# Patient Record
Sex: MALE | Race: OTHER | NOT HISPANIC OR LATINO | URBAN - METROPOLITAN AREA
[De-identification: names, ages, dates, MRNs, and addresses within clinical notes are randomized per-mention and may not be internally consistent; named-entity substitution may affect disease eponyms.]

---

## 2024-02-05 ENCOUNTER — EMERGENCY (EMERGENCY)
Facility: HOSPITAL | Age: 64
LOS: 1 days | Discharge: HOME | End: 2024-02-05
Attending: EMERGENCY MEDICINE
Payer: MEDICARE

## 2024-02-05 VITALS
RESPIRATION RATE: 20 BRPM | HEIGHT: 67 IN | OXYGEN SATURATION: 98 % | HEART RATE: 78 BPM | WEIGHT: 190.04 LBS | SYSTOLIC BLOOD PRESSURE: 143 MMHG | DIASTOLIC BLOOD PRESSURE: 89 MMHG | TEMPERATURE: 98 F

## 2024-02-05 PROCEDURE — 99283 EMERGENCY DEPT VISIT LOW MDM: CPT | Mod: FS

## 2024-02-05 PROCEDURE — 99282 EMERGENCY DEPT VISIT SF MDM: CPT

## 2024-02-05 RX ORDER — DIPHENHYDRAMINE HCL 50 MG
25 CAPSULE ORAL ONCE
Refills: 0 | Status: COMPLETED | OUTPATIENT
Start: 2024-02-05 | End: 2024-02-05

## 2024-02-05 RX ADMIN — Medication 25 MILLIGRAM(S): at 14:10

## 2024-02-05 NOTE — ED PROVIDER NOTE - OBJECTIVE STATEMENT
63-year-old male presents to ED complaining of rash to his chest armpit and genital region x 3 days.  Patient explained that he was given some shower gel by his resident coordinator and after taking a shower he applied a shower gel to his arm.  Forearm and genital region.  Patient said he noticed hardening of the skin and rash.  Patient stated he presented to ED for evaluation and appropriate treatment.  Patient denies any other issue at this time

## 2024-02-05 NOTE — ED PROVIDER NOTE - PATIENT PORTAL LINK FT
You can access the FollowMyHealth Patient Portal offered by [facility] by registering at the following website: [email or web address]. By joining [employer]’s FollowMyHealth portal, you will also be able to view your health information using other applications (apps) compatible with our system.

## 2024-02-05 NOTE — ED PROVIDER NOTE - CLINICAL SUMMARY MEDICAL DECISION MAKING FREE TEXT BOX
63-year-old male presents to ED complaining of rash to his chest armpit and genital region x 3 days.  Patient explained that he was given some shower gel by his resident coordinator and after taking a shower he applied a shower gel to his arm.  Forearm and genital region.  Patient said he noticed hardening of the skin and rash.  Patient stated he presented to ED for evaluation and appropriate treatment.  HEENT: Normal findings, Eyes : PERRLA, EOMI , Nares cler and Throat : WNL  Lungs: Clear B/L with good air entry  CVS: S1-S2 , with no murmurs  Abd : Normal BS, with no tenderness or organomegaly  Ext: Normal findings  Skin:  dry skin noted  sore chest, axillary and genital region.  Patient goes to discontinue the use of shower gel.  Patient given cream for dry skin in ED with instruction to use it twice a day.

## 2024-02-05 NOTE — ED PROVIDER NOTE - ATTENDING APP SHARED VISIT CONTRIBUTION OF CARE
Elie GAMEZKO-82-tezd-old male presents with diffuse itchy scaly rash all over the trunk arms and legs after using a new body wash.  Patient has itching and dryness.  No difficulty breathing swallowing.    Patient is alert well-appearing male, S1-S2 normal regular, bilateral clear breath sounds, abdomen is soft nontender, neuro exam is alert oriented x 3 no focal deficits skin is warm dry with diffuse scaly itchy rash on the trunk to the legs    Patient likely has allergic dermatitis and will discharge him with moisturizing cream.  Patient advised not to use the same body wash.

## 2024-02-05 NOTE — ED PROVIDER NOTE - NSFOLLOWUPINSTRUCTIONS_ED_ALL_ED_FT
Patient has been instructed to use dry skin cream twice a day for his dry skin.  Patient was advised to take a shower every other day to prevent drying and the moisturization of the skin.  Patient can follow-up with the dermatologist as discussed

## 2024-02-08 DIAGNOSIS — R21 RASH AND OTHER NONSPECIFIC SKIN ERUPTION: ICD-10-CM

## 2024-02-08 DIAGNOSIS — L85.3 XEROSIS CUTIS: ICD-10-CM

## 2024-02-26 ENCOUNTER — NON-APPOINTMENT (OUTPATIENT)
Age: 64
End: 2024-02-26

## 2024-11-21 ENCOUNTER — EMERGENCY (EMERGENCY)
Facility: HOSPITAL | Age: 64
LOS: 1 days | Discharge: HOME | End: 2024-11-21
Attending: EMERGENCY MEDICINE
Payer: MEDICARE

## 2024-11-21 VITALS — SYSTOLIC BLOOD PRESSURE: 153 MMHG | DIASTOLIC BLOOD PRESSURE: 78 MMHG

## 2024-11-21 VITALS
RESPIRATION RATE: 20 BRPM | OXYGEN SATURATION: 96 % | TEMPERATURE: 98 F | HEART RATE: 98 BPM | SYSTOLIC BLOOD PRESSURE: 172 MMHG | WEIGHT: 195.99 LBS | DIASTOLIC BLOOD PRESSURE: 109 MMHG

## 2024-11-21 PROCEDURE — 99283 EMERGENCY DEPT VISIT LOW MDM: CPT | Mod: GC

## 2024-11-21 PROCEDURE — 99282 EMERGENCY DEPT VISIT SF MDM: CPT

## 2024-11-21 NOTE — ED PROVIDER NOTE - ATTENDING CONTRIBUTION TO CARE
64 year old male with pmhx of htn presents from [place] for hypertension. Pt states earlier today he had a headache and felt anxious, BP was checked and was 170s/100s causing staff at [facility] [place] to call EMS. Pt states symptoms resolved on their own and is currently asymptomatic in the ED. Repeat BP in /78. Pt states he has been compliant with metoprolol and amlodipine  Pt remains asymptomatic - blood pressure improved without intervention. Pt without any concerns or complaints at this time. Stable for discharge, continue taking home meds as prescribed, return precautions given.

## 2024-11-21 NOTE — ED PROVIDER NOTE - PATIENT PORTAL LINK FT
You can access the FollowMyHealth Patient Portal offered by [facility] by registering at the following website: [email or web address]. By joining [employer]’s FollowMyHealth portal, you will also be able to view your health information using other applications (apps) compatible with our system.

## 2024-11-21 NOTE — ED PROVIDER NOTE - CLINICAL SUMMARY MEDICAL DECISION MAKING FREE TEXT BOX
64 year old male with pmhx of htn presents from [place] for hypertension. Pt states earlier today he had a headache and felt anxious, BP was checked and was 170s/100s causing staff at [place] to call EMS. Pt states symptoms resolved on their own and is currently asymptomatic in the ED. Repeat BP in /78. Pt states he has been compliant with metoprolol and amlodipine  Pt remains asymptomatic - blood pressure improved without intervention. Pt without any concerns or complaints at this time. Stable for discharge, continue taking home meds as prescribed, return precautions given.

## 2024-11-21 NOTE — ED PROVIDER NOTE - PHYSICAL EXAMINATION
General: Awake, alert, lying in bed in NAD  HEENT: Normocephalic, atraumatic. No scleral icterus or conjunctival injection. EOMI. Moist mucous membranes. Oropharynx clear.   Neck:. Soft and supple.  Cardiac: RRR, Peripheral pulses 2+ and symmetric. No LE edema.  Resp: Lungs CTAB. No accessory muscle use  Abd: Soft, non-tender, non-distended. No guarding, rebound, or rigidity.  Back: Spine midline and non-tender.   Skin: No rashes, abrasions, or lacerations.  Neuro: AO x 4. Moves all extremities symmetrically. Motor strength and sensation grossly intact. Lower extremity reflexes intact.  Psych: Appropriate mood and affect

## 2024-11-21 NOTE — ED PROVIDER NOTE - OBJECTIVE STATEMENT
64 year old male with pmhx of htn presents from [place] for hypertension. Per patient 64 year old male with pmhx of htn presents from [place] for hypertension. Pt states earlier today he had a headache and felt anxious, BP was checked and was 170s/100s causing staff at [place] to call EMS. Pt states symptoms resolved on their own and is currently asymptomatic in the ED. Repeat BP in /78. Pt states he has been compliant with metoprolol and amlodipine. Pt denies chest pain, sob, abdominal pain, vision changes, focal neuro deficits, recent uri symptoms, fever, or any other complaints at this time.

## 2025-05-09 ENCOUNTER — EMERGENCY (EMERGENCY)
Facility: HOSPITAL | Age: 65
LOS: 1 days | End: 2025-05-09
Attending: STUDENT IN AN ORGANIZED HEALTH CARE EDUCATION/TRAINING PROGRAM
Payer: MEDICARE

## 2025-05-09 VITALS
OXYGEN SATURATION: 95 % | HEART RATE: 105 BPM | RESPIRATION RATE: 18 BRPM | SYSTOLIC BLOOD PRESSURE: 171 MMHG | DIASTOLIC BLOOD PRESSURE: 104 MMHG | TEMPERATURE: 99 F

## 2025-05-09 PROCEDURE — 99284 EMERGENCY DEPT VISIT MOD MDM: CPT

## 2025-05-09 PROCEDURE — 93010 ELECTROCARDIOGRAM REPORT: CPT

## 2025-05-10 VITALS
DIASTOLIC BLOOD PRESSURE: 94 MMHG | SYSTOLIC BLOOD PRESSURE: 153 MMHG | HEART RATE: 82 BPM | TEMPERATURE: 98 F | OXYGEN SATURATION: 97 % | RESPIRATION RATE: 16 BRPM

## 2025-05-10 PROBLEM — I10 ESSENTIAL (PRIMARY) HYPERTENSION: Chronic | Status: ACTIVE | Noted: 2024-11-21

## 2025-05-10 PROCEDURE — 93005 ELECTROCARDIOGRAM TRACING: CPT

## 2025-05-10 PROCEDURE — 99284 EMERGENCY DEPT VISIT MOD MDM: CPT

## 2025-05-10 RX ORDER — TETRACAINE HYDROCHLORIDE 5 MG/ML
1 SOLUTION OPHTHALMIC ONCE
Refills: 0 | Status: COMPLETED | OUTPATIENT
Start: 2025-05-10 | End: 2025-05-10

## 2025-05-10 RX ORDER — BENZTROPINE MESYLATE 2 MG
1 TABLET ORAL
Qty: 14 | Refills: 0
Start: 2025-05-10 | End: 2025-05-16

## 2025-05-10 RX ORDER — KETOROLAC TROMETHAMINE 5 MG/ML
1 SOLUTION/ DROPS OPHTHALMIC
Qty: 1 | Refills: 0
Start: 2025-05-10 | End: 2025-05-12

## 2025-05-10 RX ADMIN — TETRACAINE HYDROCHLORIDE 1 DROP(S): 5 SOLUTION OPHTHALMIC at 03:10

## 2025-05-10 NOTE — ED ADULT NURSE NOTE - OBJECTIVE STATEMENT
Patient presents with c/o asymptomatic HTN r/t inability to sleep x1 day.  Pt A&Ox4, denies sob/chest pain, respirations even and unlabored, negative JVD/diaphoresis, denies h/a, blurry vision, dizziness. NSR on cardiac monitor with no events reported.

## 2025-05-10 NOTE — ED PROVIDER NOTE - NSFOLLOWUPINSTRUCTIONS_ED_ALL_ED_FT
- Cipro eye drops every six hours for pain for the next three days in your R eye. You do not have to wake yourself up if you are sleeping for more than 6 hours, just take once before bed and once after you wake up.  - Ketorolac eye drops every six hours for pain for the next three days in your R eye.  - Follow up with your primary care doctor for high blood pressure.  - Return to the ER if you have any concerns.    If you do not have a Primary Doctor please call to make an appointment at either Primary Care Clinic listed below:    [facility]  [address] [place] [address]  Phone: [phone]    [place] [address]  Phone: [phone]      Corneal Abrasion    The cornea is the clear covering at the front and center of the eye. This very thin tissue is made up of many layers. If a scratch or injury causes the corneal epithelium to come off, it is called a corneal abrasion. Symptoms include eye pain, redness, tearing, difficulty keeping eye open, and light sensitivity. Do not drive or operate machinery if your eye is patched.  Antibiotic eye drops may be prescribed to reduce the risk of infection.  It is important to follow up with an ophthalmologist (eye doctor) to ensure proper healing.    SEEK IMMEDIATE MEDICAL CARE IF YOU HAVE ANY OF THE FOLLOWING SYMPTOMS: discharge from eyes, changes in vision, fever, or swelling.

## 2025-05-10 NOTE — ED ADULT NURSE NOTE - CAS EDN DISCHARGE ASSESSMENT
I have reviewed the history and physical and examined the patient and find no relevant changes. I have reviewed with the patient and/or family the risks, benefits, and alternatives to the procedure. Alert and oriented to person, place and time/No adverse reaction to first time med in ED

## 2025-05-10 NOTE — ED PROVIDER NOTE - CLINICAL SUMMARY MEDICAL DECISION MAKING FREE TEXT BOX
65 y/o male w/pmhx of HTN on amlodipine and metoprolol. Currently asymptomatic other than R eye pain which he has had intermittently for a month. BP at 153/94, consistent with asymptomatic hypertension which does not need to be treated in the ER at this time. Minor corneal abrasion visualized in the L eye, will give eye drops and dc.

## 2025-05-10 NOTE — ED PROVIDER NOTE - ATTENDING CONTRIBUTION TO CARE
64 yom pmh HTN here with complaints of high blood pressure. Denies dizziness, cp, sob, abd pain. Also with intermittent burning sensation to right eye, foreign body sensation. does not wear contacts. Also requesting nail clippers for his nails  Ap - overall asymptomatic htn. continue home meds. small corneal abrasion, will treat. outpt f/u

## 2025-05-10 NOTE — ED PROVIDER NOTE - PATIENT PORTAL LINK FT
You can access the FollowMyHealth Patient Portal offered by [facility] by registering at the following website: [email or web address]. By joining [employer]’s FollowMyHealth portal, you will also be able to view your health information using other applications (apps) compatible with our system.

## 2025-05-10 NOTE — ED PROVIDER NOTE - PHYSICAL EXAMINATION
General: well appearing, NAD  Head: NC, AT  EENT: PERRLA, EOMI, no scleral icterus, minor superficial abrasion R eye on wood's lamp exam  Cardiac: RRR, no apparent murmurs, no lower extremity edema  Respiratory: CTABL, no respiratory distress   Abdomen: soft, ND, NT, nonperitonitic  MSK/Vascular: full ROM, soft compartments, warm extremities, 2+ peripheral pulses b/l  Neuro: AAOx3, sensation to light touch intact  Psych: calm, cooperative

## 2025-05-10 NOTE — ED PROVIDER NOTE - OBJECTIVE STATEMENT
64-year-old male past medical history of hypertension presenting from outpatient [place] complaining of difficulty sleeping, and high blood pressure.  Takes amlodipine and metoprolol and says he has been taking his medications.  Did have a headache prior to arrival which is since resolved, also endorsing intermittent burning in his right eye, does not wear contact lenses.  Denies chest pain, shortness of breath, abdominal pain, nausea/vomiting, leg pain, difficulty walking.